# Patient Record
Sex: FEMALE | Race: WHITE
[De-identification: names, ages, dates, MRNs, and addresses within clinical notes are randomized per-mention and may not be internally consistent; named-entity substitution may affect disease eponyms.]

---

## 2018-01-02 ENCOUNTER — HOSPITAL ENCOUNTER (EMERGENCY)
Dept: HOSPITAL 47 - EC | Age: 28
Discharge: HOME | End: 2018-01-02
Payer: COMMERCIAL

## 2018-01-02 DIAGNOSIS — F17.200: ICD-10-CM

## 2018-01-02 DIAGNOSIS — K08.89: Primary | ICD-10-CM

## 2018-01-02 PROCEDURE — 99282 EMERGENCY DEPT VISIT SF MDM: CPT

## 2018-01-02 NOTE — ED
ENT HPI





- General


Chief complaint: Dental/Oral


Stated complaint: dental pain


Time Seen by Provider: 01/02/18 21:10


Source: patient, RN notes reviewed


Mode of arrival: ambulatory


Limitations: no limitations





- History of Present Illness


Initial comments: 


This is a 27-year-old female who presents to the emergency department with 

chief complaint of dental pain.  Patient states that she has been experiencing 

pain in a right upper tooth.  She states that she has an appointment in 2 weeks 

with her dentist in [place].  She says that she is either going to be getting 

a root canal or having the tooth extracted.  Patient states she is in too much 

pain and cannot wait 2 weeks to see the dentist.  Her dentist is closed the 

first week of January so has been unable to call the office.  Denies any 

radiation of pain to the neck. Denies fever, chills, chest pain, shortness of 

breath, abdominal pain, nausea or vomiting, constipation or diarrhea, dysuria 

or hematuria, numbness or tingling, headache or vision changes.  








- Related Data


 Home Medications











 Medication  Instructions  Recorded  Confirmed


 


Aspirin-Acet-Caff [id number] 1 tab PO Q4HR PRN 01/02/18 01/02/18





[Excedrin]   








 Previous Rx's











 Medication  Instructions  Recorded


 


Ibuprofen 600 mg PO Q6HR #30 tablet 01/02/18


 


Penicillin V Potassium [Pen Vee K] 500 mg PO QID 10 Days  tab 01/02/18











 Allergies











Allergy/AdvReac Type Severity Reaction Status Date / Time


 


No Known Allergies Allergy   Verified 01/02/18 20:53














Review of Systems


ROS Statement: 


Those systems with pertinent positive or pertinent negative responses have been 

documented in the HPI.





ROS Other: All systems not noted in ROS Statement are negative.





Past Medical History


Past Medical History: No Reported History


History of Any Multi-Drug Resistant Organisms: None Reported


Past Surgical History: No Surgical Hx Reported


Past Psychological History: No Psychological Hx Reported


Smoking Status: Current every day smoker


Past Alcohol Use History: Occasional


Past Drug Use History: None Reported





General Exam





- General Exam Comments


Initial Comments: 


General: Awake and alert, well-developed; in no apparent distress.


HEENT: Head atraumatic, normocephalic. Pupils are equal, round and reactive to 

light. Extraocular movements intact. Oropharynx moist without erythema or 

exudate.  Tenderness on palpation of tooth #4 and gumline.  No drainage, masses 

or areas of fluctuance noted.  No tenderness on palpation of the mandible.


Neck: Supple. Normal ROM. 


Cardiovascular: Regular rate and rhythm. No murmurs, rubs or gallops. Chest 

symmetrical.  


Respiratory: Lungs clear to auscultation bilaterally. No wheezes, rales or 

rhonchi. Normal respiratory effort with no use of accessory muscles. 


Musculoskeletal: Normal ROM, no tenderness upper and lower extremities.  

Ambulating normally.


Skin: Pink, warm and dry without rashes or lesions. 


Neurological: Alert and oriented x3. CN II-XII grossly intact. Speech is fluent 

and answers are appropriate. No focal neuro deficits. 


Psychiatric: Normal mood and affect. No overt signs of depression or anxiety 

noted. 














Limitations: no limitations





Course





 Vital Signs











  01/02/18





  20:43


 


Temperature 97.6 F


 


Pulse Rate 80


 


Respiratory 18





Rate 


 


Blood Pressure 156/98


 


O2 Sat by Pulse 98





Oximetry 














Medical Decision Making





- Medical Decision Making


This is a 19-year-old female who presents for evaluation of dental pain.  Tooth 

#4 appears to have a dental caries and is tender on palpation.  No abscess is 

noted.  Patient denies any fevers or chills.  Patient will be discharged home 

with a prescription for ibuprofen and antibiotics.  She was provided a starter 

pack for Tylenol with Codeine.  Recommended follow-up with her dentist as 

scheduled.  She'll also be provided the contact information for [facility] dental plan.  Patient is in agreement with plan voices understanding.

  All questions were answered.








Disposition


Clinical Impression: 


 Toothache





Disposition: HOME SELF-CARE


Condition: Good


Instructions:  Toothache (ED)


Additional Instructions: 


Please take medications as prescribed.  Please follow up with dentist as 

scheduled.  Please follow up with primary care provider within 1-2 days. Return 

to emergency department if symptoms should worsen or any concerns arise.


Please follow up with the [facility] dental clinic.  [facility] [place] [address]. Phone number [phone] for new patients or [phone] for existing patients.  


Prescriptions: 


Ibuprofen 600 mg PO Q6HR #30 tablet


Penicillin V Potassium [Pen Vee K] 500 mg PO QID 10 Days  tab


Referrals: 


Stromberg,Reid, MD [Primary Care Provider] - 1-2 days


Time of Disposition: 21:25

## 2018-01-03 VITALS
SYSTOLIC BLOOD PRESSURE: 156 MMHG | RESPIRATION RATE: 18 BRPM | TEMPERATURE: 97.6 F | HEART RATE: 80 BPM | DIASTOLIC BLOOD PRESSURE: 98 MMHG

## 2019-09-20 ENCOUNTER — HOSPITAL ENCOUNTER (EMERGENCY)
Dept: HOSPITAL 47 - EC | Age: 29
Discharge: HOME | End: 2019-09-20
Payer: COMMERCIAL

## 2019-09-20 VITALS
SYSTOLIC BLOOD PRESSURE: 116 MMHG | TEMPERATURE: 98.3 F | DIASTOLIC BLOOD PRESSURE: 97 MMHG | RESPIRATION RATE: 18 BRPM | HEART RATE: 69 BPM

## 2019-09-20 DIAGNOSIS — D72.829: ICD-10-CM

## 2019-09-20 DIAGNOSIS — F17.200: ICD-10-CM

## 2019-09-20 DIAGNOSIS — K57.32: Primary | ICD-10-CM

## 2019-09-20 LAB
ALBUMIN SERPL-MCNC: 5.1 G/DL (ref 3.5–5)
ALP SERPL-CCNC: 83 U/L (ref 38–126)
ALT SERPL-CCNC: 19 U/L (ref 9–52)
ANION GAP SERPL CALC-SCNC: 14 MMOL/L
AST SERPL-CCNC: 21 U/L (ref 14–36)
BASOPHILS # BLD AUTO: 0.3 K/UL (ref 0–0.2)
BASOPHILS NFR BLD AUTO: 1 %
BUN SERPL-SCNC: 16 MG/DL (ref 7–17)
CALCIUM SPEC-MCNC: 10.2 MG/DL (ref 8.4–10.2)
CHLORIDE SERPL-SCNC: 98 MMOL/L (ref 98–107)
CO2 SERPL-SCNC: 24 MMOL/L (ref 22–30)
EOSINOPHIL # BLD AUTO: 0.3 K/UL (ref 0–0.7)
EOSINOPHIL NFR BLD AUTO: 1 %
ERYTHROCYTE [DISTWIDTH] IN BLOOD BY AUTOMATED COUNT: 4.85 M/UL (ref 3.8–5.4)
ERYTHROCYTE [DISTWIDTH] IN BLOOD: 12.6 % (ref 11.5–15.5)
GLUCOSE SERPL-MCNC: 102 MG/DL (ref 74–99)
HCT VFR BLD AUTO: 45.2 % (ref 34–46)
HGB BLD-MCNC: 15.7 GM/DL (ref 11.4–16)
HYALINE CASTS UR QL AUTO: 20 /LPF (ref 0–2)
KETONES UR QL STRIP.AUTO: (no result)
LYMPHOCYTES # SPEC AUTO: 1.2 K/UL (ref 1–4.8)
LYMPHOCYTES NFR SPEC AUTO: 5 %
MCH RBC QN AUTO: 32.3 PG (ref 25–35)
MCHC RBC AUTO-ENTMCNC: 34.7 G/DL (ref 31–37)
MCV RBC AUTO: 93.2 FL (ref 80–100)
MIXED CELL CASTS UR QL COMP ASSIST: 7 /LPF
MONOCYTES # BLD AUTO: 0.8 K/UL (ref 0–1)
MONOCYTES NFR BLD AUTO: 3 %
NEUTROPHILS # BLD AUTO: 23.4 K/UL (ref 1.3–7.7)
NEUTROPHILS NFR BLD AUTO: 89 %
PH UR: 5.5 [PH] (ref 5–8)
PLATELET # BLD AUTO: 318 K/UL (ref 150–450)
POTASSIUM SERPL-SCNC: 4.1 MMOL/L (ref 3.5–5.1)
PROT SERPL-MCNC: 8.6 G/DL (ref 6.3–8.2)
PROT UR QL: (no result)
RBC UR QL: <1 /HPF (ref 0–5)
SODIUM SERPL-SCNC: 136 MMOL/L (ref 137–145)
SP GR UR: 1.02 (ref 1–1.03)
SQUAMOUS UR QL AUTO: <1 /HPF (ref 0–4)
UROBILINOGEN UR QL STRIP: <2 MG/DL (ref ?–2)
WBC # BLD AUTO: 26.2 K/UL (ref 3.8–10.6)

## 2019-09-20 PROCEDURE — 96374 THER/PROPH/DIAG INJ IV PUSH: CPT

## 2019-09-20 PROCEDURE — 81001 URINALYSIS AUTO W/SCOPE: CPT

## 2019-09-20 PROCEDURE — 99284 EMERGENCY DEPT VISIT MOD MDM: CPT

## 2019-09-20 PROCEDURE — 74177 CT ABD & PELVIS W/CONTRAST: CPT

## 2019-09-20 PROCEDURE — 80053 COMPREHEN METABOLIC PANEL: CPT

## 2019-09-20 PROCEDURE — 85025 COMPLETE CBC W/AUTO DIFF WBC: CPT

## 2019-09-20 PROCEDURE — 83690 ASSAY OF LIPASE: CPT

## 2019-09-20 PROCEDURE — 36415 COLL VENOUS BLD VENIPUNCTURE: CPT

## 2019-09-20 PROCEDURE — 81025 URINE PREGNANCY TEST: CPT

## 2019-09-20 PROCEDURE — 96361 HYDRATE IV INFUSION ADD-ON: CPT

## 2019-09-20 NOTE — CT
EXAMINATION TYPE: CT abdomen pelvis w con

 

DATE OF EXAM: 9/20/2019

 

COMPARISON: None 6/7/2016

 

HISTORY: Lt side abd pain, fever

 

CT DLP: 595.3 mGycm

 

CONTRAST: 

CT scan of the abdomen and pelvis is performed without Oral Contrast and with IV Contrast, patient in
jected with 100 mL of Isovue 300.

 

FINDINGS: 

LUNG BASES-: No visible nodule.  No infiltrate. 

 

LIVER/GB:   No calcified gallstones.  No space occupying hepatic lesion. Biliary tree is of normal ca
liber. 

 

PANCREAS:  No inflammation.  No distinct mass. 

 

SPLEEN:  No splenic enlargement.  No lesion seen. 

 

ADRENALS:  No nodule.  No thickening. 

 

KIDNEYS/BLADDER:  No hydronephrosis.  No nephrolithiasis.  No distinct renal mass.  Urinary bladder g
rossly unremarkable. 

 

BOWEL: Normal appendix. Mild inflammatory change adjacent to the mid to distal descending colon may r
eflect uncomplicated acute reticulitis versus epiploic appendagitis. No evidence for perforation or a
bscess. No free air. Mild small bowel ileus versus reactive in nature.

 

GENITAL ORGANS:  No gross abnormality. 

 

LYMPH NODES:  No greater than 1cm abdominal or pelvic lymph nodes are appreciated.

 

AORTA: No significant abnormality. 

 

OSSEOUS STRUCTURES:  No significant abnormality is seen. 

 

OTHER:  No significant additional abnormality is seen. 

 

IMPRESSION: 

1. Mild inflammatory change adjacent to the mid to distal descending colon may reflect uncomplicated 
acute diverticulitis which is favored over  epiploic appendagitis.

## 2022-05-25 NOTE — ED
Abdominal Pain HPI





- General


Chief Complaint: Abdominal Pain


Stated Complaint: Abd pain, fever


Time Seen by Provider: 09/20/19 09:38


Source: patient, RN notes reviewed


Mode of arrival: ambulatory


Limitations: no limitations





- History of Present Illness


Initial Comments: 





This is a 29-year-old female presents emergency Department with chief complaint 

of left-sided abdominal pain.  Patient states his been progressing over the last

couple days.  Patient states that she's had a fever 102 at home.  She has no 

history of abdominal complaints including diverticulitis, colitis.  Patient does

take Adipex currently.  Patient denies any dysuria or hematuria she states her 

urine is darker needle no she has not been drinking much fluids.  She denies any

chance pregnancy.  Patient denies any change in bowel habits though she thought 

she was initially constipated so she took some laxatives.  Patient states pain 

is worse with movement.





- Related Data


                                Home Medications











 Medication  Instructions  Recorded  Confirmed


 


Phentermine HCl [Adipex P] 15 mg PO AC-BRKFST 09/20/19 09/20/19








                                  Previous Rx's











 Medication  Instructions  Recorded


 


Ciprofloxacin HCl [Cipro] 500 mg PO Q12HR #20 tablet 09/20/19


 


Ibuprofen [Motrin] 600 mg PO Q8HR PRN #30 tab 09/20/19


 


Ondansetron Odt [Zofran Odt] 4 mg PO Q8HR PRN #10 tab 09/20/19


 


metroNIDAZOLE [Flagyl] 500 mg PO TID #30 tab 09/20/19











                                    Allergies











Allergy/AdvReac Type Severity Reaction Status Date / Time


 


No Known Allergies Allergy   Verified 09/20/19 09:46














Review of Systems


ROS Statement: 


Those systems with pertinent positive or pertinent negative responses have been 

documented in the HPI.





ROS Other: All systems not noted in ROS Statement are negative.





Past Medical History


Past Medical History: No Reported History


History of Any Multi-Drug Resistant Organisms: None Reported


Past Surgical History: No Surgical Hx Reported


Past Psychological History: No Psychological Hx Reported


Smoking Status: Current every day smoker


Past Alcohol Use History: Occasional


Past Drug Use History: None Reported





General Exam


Limitations: no limitations


General appearance: alert, in no apparent distress


Head exam: Present: atraumatic, normocephalic, normal inspection


Eye exam: Present: normal appearance, PERRL, EOMI.  Absent: scleral icterus, 

conjunctival injection, periorbital swelling


Respiratory exam: Present: normal lung sounds bilaterally.  Absent: respiratory 

distress, wheezes, rales, rhonchi, stridor


Cardiovascular Exam: Present: normal rhythm, tachycardia, normal heart sounds.  

Absent: systolic murmur, diastolic murmur, rubs, gallop, clicks


GI/Abdominal exam: Present: soft, tenderness (Moderate left-sided abdominal 

tenderness), normal bowel sounds.  Absent: distended, guarding, rebound, rigid


Back exam: Absent: CVA tenderness (R), CVA tenderness (L)


Skin exam: Present: warm, dry, intact, normal color.  Absent: rash





Course


                                   Vital Signs











  09/20/19





  09:30


 


Temperature 97.9 F


 


Pulse Rate 110 H


 


Respiratory 16





Rate 


 


Blood Pressure 123/72


 


O2 Sat by Pulse 100





Oximetry 














Medical Decision Making





- Medical Decision Making


29-year-old female presented from for left-sided abdominal pain CT was obtained 

shows evidence of uncomplicated diverticulitis.  Patient has no evidence of 

abscess.  Patient does have moderate leukocytosis.  Patient stated controlled 

after Toradol.  Patient was offered admission versus discharge.  Patient prefers

 to be discharged on antibiotics with close follow-up return parameters were 

discussed.








- Lab Data


Result diagrams: 


                                 09/20/19 10:17





                                 09/20/19 10:17


                                   Lab Results











  09/20/19 09/20/19 09/20/19 Range/Units





  10:17 10:17 10:17 


 


WBC  26.2 H    (3.8-10.6)  k/uL


 


RBC  4.85    (3.80-5.40)  m/uL


 


Hgb  15.7    (11.4-16.0)  gm/dL


 


Hct  45.2    (34.0-46.0)  %


 


MCV  93.2    (80.0-100.0)  fL


 


MCH  32.3    (25.0-35.0)  pg


 


MCHC  34.7    (31.0-37.0)  g/dL


 


RDW  12.6    (11.5-15.5)  %


 


Plt Count  318    (150-450)  k/uL


 


Neutrophils %  89    %


 


Lymphocytes %  5    %


 


Monocytes %  3    %


 


Eosinophils %  1    %


 


Basophils %  1    %


 


Neutrophils #  23.4 H    (1.3-7.7)  k/uL


 


Lymphocytes #  1.2    (1.0-4.8)  k/uL


 


Monocytes #  0.8    (0-1.0)  k/uL


 


Eosinophils #  0.3    (0-0.7)  k/uL


 


Basophils #  0.3 H    (0-0.2)  k/uL


 


Sodium   136 L   (137-145)  mmol/L


 


Potassium   4.1   (3.5-5.1)  mmol/L


 


Chloride   98   ()  mmol/L


 


Carbon Dioxide   24   (22-30)  mmol/L


 


Anion Gap   14   mmol/L


 


BUN   16   (7-17)  mg/dL


 


Creatinine   0.81   (0.52-1.04)  mg/dL


 


Est GFR (CKD-EPI)AfAm   >90   (>60 ml/min/1.73 sqM)  


 


Est GFR (CKD-EPI)NonAf   >90   (>60 ml/min/1.73 sqM)  


 


Glucose   102 H   (74-99)  mg/dL


 


Calcium   10.2   (8.4-10.2)  mg/dL


 


Total Bilirubin   1.3   (0.2-1.3)  mg/dL


 


AST   21   (14-36)  U/L


 


ALT   19   (9-52)  U/L


 


Alkaline Phosphatase   83   ()  U/L


 


Total Protein   8.6 H   (6.3-8.2)  g/dL


 


Albumin   5.1 H   (3.5-5.0)  g/dL


 


Lipase   58   ()  U/L


 


Urine Color     


 


Urine Appearance     (Clear)  


 


Urine pH     (5.0-8.0)  


 


Ur Specific Gravity     (1.001-1.035)  


 


Urine Protein     (Negative)  


 


Urine Glucose (UA)     (Negative)  


 


Urine Ketones     (Negative)  


 


Urine Blood     (Negative)  


 


Urine Nitrite     (Negative)  


 


Urine Bilirubin     (Negative)  


 


Urine Urobilinogen     (<2.0)  mg/dL


 


Ur Leukocyte Esterase     (Negative)  


 


Urine RBC     (0-5)  /hpf


 


Urine WBC     (0-5)  /hpf


 


Ur Squamous Epith Cells     (0-4)  /hpf


 


Cellular Casts     (0)  /lpf


 


Hyaline Casts     (0-2)  /lpf


 


Urine Mucus     (None)  /hpf


 


Urine HCG, Qual    Not Detected  (Not Detectd)  














  09/20/19 Range/Units





  10:17 


 


WBC   (3.8-10.6)  k/uL


 


RBC   (3.80-5.40)  m/uL


 


Hgb   (11.4-16.0)  gm/dL


 


Hct   (34.0-46.0)  %


 


MCV   (80.0-100.0)  fL


 


MCH   (25.0-35.0)  pg


 


MCHC   (31.0-37.0)  g/dL


 


RDW   (11.5-15.5)  %


 


Plt Count   (150-450)  k/uL


 


Neutrophils %   %


 


Lymphocytes %   %


 


Monocytes %   %


 


Eosinophils %   %


 


Basophils %   %


 


Neutrophils #   (1.3-7.7)  k/uL


 


Lymphocytes #   (1.0-4.8)  k/uL


 


Monocytes #   (0-1.0)  k/uL


 


Eosinophils #   (0-0.7)  k/uL


 


Basophils #   (0-0.2)  k/uL


 


Sodium   (137-145)  mmol/L


 


Potassium   (3.5-5.1)  mmol/L


 


Chloride   ()  mmol/L


 


Carbon Dioxide   (22-30)  mmol/L


 


Anion Gap   mmol/L


 


BUN   (7-17)  mg/dL


 


Creatinine   (0.52-1.04)  mg/dL


 


Est GFR (CKD-EPI)AfAm   (>60 ml/min/1.73 sqM)  


 


Est GFR (CKD-EPI)NonAf   (>60 ml/min/1.73 sqM)  


 


Glucose   (74-99)  mg/dL


 


Calcium   (8.4-10.2)  mg/dL


 


Total Bilirubin   (0.2-1.3)  mg/dL


 


AST   (14-36)  U/L


 


ALT   (9-52)  U/L


 


Alkaline Phosphatase   ()  U/L


 


Total Protein   (6.3-8.2)  g/dL


 


Albumin   (3.5-5.0)  g/dL


 


Lipase   ()  U/L


 


Urine Color  Yellow  


 


Urine Appearance  Clear  (Clear)  


 


Urine pH  5.5  (5.0-8.0)  


 


Ur Specific Gravity  1.022  (1.001-1.035)  


 


Urine Protein  1+ H  (Negative)  


 


Urine Glucose (UA)  Negative  (Negative)  


 


Urine Ketones  2+ H  (Negative)  


 


Urine Blood  Negative  (Negative)  


 


Urine Nitrite  Negative  (Negative)  


 


Urine Bilirubin  Negative  (Negative)  


 


Urine Urobilinogen  <2.0  (<2.0)  mg/dL


 


Ur Leukocyte Esterase  Negative  (Negative)  


 


Urine RBC  <1  (0-5)  /hpf


 


Urine WBC  2  (0-5)  /hpf


 


Ur Squamous Epith Cells  <1  (0-4)  /hpf


 


Cellular Casts  7  (0)  /lpf


 


Hyaline Casts  20 H  (0-2)  /lpf


 


Urine Mucus  Many H  (None)  /hpf


 


Urine HCG, Qual   (Not Detectd)  














Disposition


Clinical Impression: 


 Diverticulitis





Disposition: HOME SELF-CARE


Condition: Stable


Instructions (If sedation given, give patient instructions):  Diverticulitis 

Diet (ED), Diverticulitis (ED)


Additional Instructions: 


Please return to the Emergency Department if symptoms worsen or any other 

concerns.


Prescriptions: 


Ciprofloxacin HCl [Cipro] 500 mg PO Q12HR #20 tablet


metroNIDAZOLE [Flagyl] 500 mg PO TID #30 tab


Ibuprofen [Motrin] 600 mg PO Q8HR PRN #30 tab


 PRN Reason: Pain


Ondansetron Odt [Zofran Odt] 4 mg PO Q8HR PRN #10 tab


 PRN Reason: Nausea


Is patient prescribed a controlled substance at d/c from ED?: No


Referrals: 


Stromberg,Reid, MD [Primary Care Provider] - 1-2 days


Time of Disposition: 11:41 [Fully active, able to carry on all pre-disease performance without restriction] : Status 0 - Fully active, able to carry on all pre-disease performance without restriction [Normal] : affect appropriate